# Patient Record
Sex: FEMALE | URBAN - METROPOLITAN AREA
[De-identification: names, ages, dates, MRNs, and addresses within clinical notes are randomized per-mention and may not be internally consistent; named-entity substitution may affect disease eponyms.]

---

## 2017-01-11 ENCOUNTER — COMMUNICATION - HEALTHEAST (OUTPATIENT)
Dept: PHYSICAL MEDICINE AND REHAB | Facility: CLINIC | Age: 42
End: 2017-01-11

## 2017-01-23 ENCOUNTER — HOSPITAL ENCOUNTER (OUTPATIENT)
Dept: PHYSICAL MEDICINE AND REHAB | Facility: CLINIC | Age: 42
Discharge: HOME | End: 2017-01-23
Attending: NURSE PRACTITIONER

## 2017-01-23 DIAGNOSIS — M51.369 ANNULAR TEAR OF LUMBAR DISC: ICD-10-CM

## 2017-01-23 DIAGNOSIS — M53.3 SACROILIAC JOINT DYSFUNCTION OF LEFT SIDE: ICD-10-CM

## 2017-01-23 DIAGNOSIS — M79.18 MYOFASCIAL MUSCLE PAIN: ICD-10-CM

## 2017-01-23 DIAGNOSIS — R53.81 PHYSICAL DECONDITIONING: ICD-10-CM

## 2017-01-23 DIAGNOSIS — M51.369 DISC DEGENERATION, LUMBAR: ICD-10-CM

## 2017-01-23 DIAGNOSIS — G89.29 CHRONIC BILATERAL BACK PAIN: ICD-10-CM

## 2017-01-23 DIAGNOSIS — M54.9 CHRONIC BILATERAL BACK PAIN: ICD-10-CM

## 2017-01-23 DIAGNOSIS — E66.9 OBESITY: ICD-10-CM

## 2017-01-23 RX ORDER — IBUPROFEN 600 MG/1
600 TABLET, FILM COATED ORAL EVERY 6 HOURS PRN
Status: SHIPPED | COMMUNITY
Start: 2016-11-15

## 2017-01-23 RX ORDER — VENLAFAXINE HYDROCHLORIDE 150 MG/1
150 CAPSULE, EXTENDED RELEASE ORAL AT BEDTIME
Status: SHIPPED | COMMUNITY
Start: 2016-04-19

## 2017-01-23 RX ORDER — LANOLIN ALCOHOL/MO/W.PET/CERES
3-6 CREAM (GRAM) TOPICAL
Status: SHIPPED | COMMUNITY
Start: 2014-04-08

## 2017-01-24 ENCOUNTER — RECORDS - HEALTHEAST (OUTPATIENT)
Dept: ADMINISTRATIVE | Facility: OTHER | Age: 42
End: 2017-01-24

## 2017-07-29 ENCOUNTER — HEALTH MAINTENANCE LETTER (OUTPATIENT)
Age: 42
End: 2017-07-29

## 2019-11-04 ENCOUNTER — HEALTH MAINTENANCE LETTER (OUTPATIENT)
Age: 44
End: 2019-11-04

## 2020-11-22 ENCOUNTER — HEALTH MAINTENANCE LETTER (OUTPATIENT)
Age: 45
End: 2020-11-22

## 2021-05-24 ENCOUNTER — RECORDS - HEALTHEAST (OUTPATIENT)
Dept: ADMINISTRATIVE | Facility: CLINIC | Age: 46
End: 2021-05-24

## 2021-05-25 ENCOUNTER — RECORDS - HEALTHEAST (OUTPATIENT)
Dept: ADMINISTRATIVE | Facility: CLINIC | Age: 46
End: 2021-05-25

## 2021-05-26 ENCOUNTER — RECORDS - HEALTHEAST (OUTPATIENT)
Dept: ADMINISTRATIVE | Facility: CLINIC | Age: 46
End: 2021-05-26

## 2021-05-27 ENCOUNTER — RECORDS - HEALTHEAST (OUTPATIENT)
Dept: ADMINISTRATIVE | Facility: CLINIC | Age: 46
End: 2021-05-27

## 2021-05-28 ENCOUNTER — RECORDS - HEALTHEAST (OUTPATIENT)
Dept: ADMINISTRATIVE | Facility: CLINIC | Age: 46
End: 2021-05-28

## 2021-05-29 ENCOUNTER — RECORDS - HEALTHEAST (OUTPATIENT)
Dept: ADMINISTRATIVE | Facility: CLINIC | Age: 46
End: 2021-05-29

## 2021-05-30 VITALS — WEIGHT: 230 LBS | BODY MASS INDEX: 40.74 KG/M2

## 2021-05-31 ENCOUNTER — RECORDS - HEALTHEAST (OUTPATIENT)
Dept: ADMINISTRATIVE | Facility: CLINIC | Age: 46
End: 2021-05-31

## 2021-06-01 ENCOUNTER — RECORDS - HEALTHEAST (OUTPATIENT)
Dept: ADMINISTRATIVE | Facility: CLINIC | Age: 46
End: 2021-06-01
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
## 2021-06-16 PROBLEM — E28.2 POLYCYSTIC DISEASE, OVARIES: Status: ACTIVE | Noted: 2019-01-26

## 2021-06-16 PROBLEM — M54.50 ACUTE RIGHT-SIDED LOW BACK PAIN WITHOUT SCIATICA: Status: ACTIVE | Noted: 2020-05-28

## 2021-06-16 PROBLEM — M54.50 CHRONIC BILATERAL LOW BACK PAIN WITHOUT SCIATICA: Status: ACTIVE | Noted: 2017-11-22

## 2021-06-16 PROBLEM — Z12.4 SCREENING FOR MALIGNANT NEOPLASM OF CERVIX: Status: ACTIVE | Noted: 2017-03-10

## 2021-06-16 PROBLEM — G89.29 CHRONIC BILATERAL LOW BACK PAIN WITHOUT SCIATICA: Status: ACTIVE | Noted: 2017-11-22

## 2021-09-19 ENCOUNTER — HEALTH MAINTENANCE LETTER (OUTPATIENT)
Age: 46
End: 2021-09-19

## 2022-01-08 ENCOUNTER — HEALTH MAINTENANCE LETTER (OUTPATIENT)
Age: 47
End: 2022-01-08

## 2022-04-30 ENCOUNTER — HEALTH MAINTENANCE LETTER (OUTPATIENT)
Age: 47
End: 2022-04-30

## 2022-11-20 ENCOUNTER — HEALTH MAINTENANCE LETTER (OUTPATIENT)
Age: 47
End: 2022-11-20

## 2023-04-15 ENCOUNTER — HEALTH MAINTENANCE LETTER (OUTPATIENT)
Age: 48
End: 2023-04-15

## 2023-06-02 ENCOUNTER — HEALTH MAINTENANCE LETTER (OUTPATIENT)
Age: 48
End: 2023-06-02